# Patient Record
Sex: FEMALE | Race: WHITE | URBAN - METROPOLITAN AREA
[De-identification: names, ages, dates, MRNs, and addresses within clinical notes are randomized per-mention and may not be internally consistent; named-entity substitution may affect disease eponyms.]

---

## 2019-02-13 ENCOUNTER — OFFICE (OUTPATIENT)
Dept: URBAN - METROPOLITAN AREA CLINIC 78 | Facility: CLINIC | Age: 79
End: 2019-02-13
Payer: COMMERCIAL

## 2019-02-13 VITALS
SYSTOLIC BLOOD PRESSURE: 150 MMHG | TEMPERATURE: 98.5 F | HEART RATE: 82 BPM | HEIGHT: 62 IN | DIASTOLIC BLOOD PRESSURE: 81 MMHG | WEIGHT: 184 LBS

## 2019-02-13 DIAGNOSIS — B96.81 HELICOBACTER PYLORI [H. PYLORI] AS THE CAUSE OF DISEASES CLA: ICD-10-CM

## 2019-02-13 DIAGNOSIS — K22.4 DYSKINESIA OF ESOPHAGUS: ICD-10-CM

## 2019-02-13 DIAGNOSIS — R19.5 OTHER FECAL ABNORMALITIES: ICD-10-CM

## 2019-02-13 PROCEDURE — 99214 OFFICE O/P EST MOD 30 MIN: CPT

## 2019-02-13 RX ORDER — FAMOTIDINE 20 MG/1
TABLET, FILM COATED ORAL
Qty: 60 | Refills: 1 | Status: ACTIVE

## 2019-02-13 NOTE — SERVICEHPINOTES
79 yo white female presents for black stools. She says she was having diarrhea about 3-4 weeks ago and stools were black. She does note use of Kaopectate but she says stools were black prior to taking the Kaopectate. She was having 6-8 BMs per day and would have some incontinence due to urgency. She denies abdominal pain. Diarrhea has improved and stools are more formed now. She's had some black and brown stools. She denies NSAID uses. No bright red blood in stools. No fevers or weight loss. She denies seeing her PCP about this but notes that she did have some routine blood work about a month ago, prior to symptom onset. She denies abdominal pain or any other new GI sx. Has h/o occasional N/V which is long-standing - Has an esophageal motility disorder and was seen at [place] by Dr. Hutson in 2008. Uses nitrate pills occasionally for this. No h/o heart disease. She has a h/o iron-deficiency anemia for which she had colonoscopy, EGD, and capsule study in 2014. Her EGD at that time showed gastric erosions (with stigmata of recent oozing so these were cauterized--patient had been on Naproxen). Colonoscopy and capsule essentially negative, though capsule did not completely visualize entire small bowel. She had another EGD in 2016 showing fundic gland polyps and gastritis - H pylori was positive and was treated with Prevpac. No f/u testing done.  BR

## 2019-02-14 LAB
CBC (INCLUDES DIFF/PLT): ABSOLUTE BAND NEUTROPHILS: NORMAL CELLS/UL
CBC (INCLUDES DIFF/PLT): ABSOLUTE BASOPHILS: 42 CELLS/UL (ref 0–200)
CBC (INCLUDES DIFF/PLT): ABSOLUTE BLASTS: NORMAL CELLS/UL
CBC (INCLUDES DIFF/PLT): ABSOLUTE EOSINOPHILS: 238 CELLS/UL (ref 15–500)
CBC (INCLUDES DIFF/PLT): ABSOLUTE LYMPHOCYTES: 1974 CELLS/UL (ref 850–3900)
CBC (INCLUDES DIFF/PLT): ABSOLUTE METAMYELOCYTES: NORMAL CELLS/UL
CBC (INCLUDES DIFF/PLT): ABSOLUTE MONOCYTES: 553 CELLS/UL (ref 200–950)
CBC (INCLUDES DIFF/PLT): ABSOLUTE MYELOCYTES: NORMAL CELLS/UL
CBC (INCLUDES DIFF/PLT): ABSOLUTE NEUTROPHILS: 4193 CELLS/UL (ref 1500–7800)
CBC (INCLUDES DIFF/PLT): ABSOLUTE NUCLEATED RBC: NORMAL CELLS/UL
CBC (INCLUDES DIFF/PLT): ABSOLUTE PROMYELOCYTES: NORMAL CELLS/UL
CBC (INCLUDES DIFF/PLT): BAND NEUTROPHILS: NORMAL %
CBC (INCLUDES DIFF/PLT): BASOPHILS: 0.6 %
CBC (INCLUDES DIFF/PLT): BLASTS: NORMAL %
CBC (INCLUDES DIFF/PLT): COMMENT(S): NORMAL
CBC (INCLUDES DIFF/PLT): EOSINOPHILS: 3.4 %
CBC (INCLUDES DIFF/PLT): HEMATOCRIT: 34.9 % — LOW (ref 35–45)
CBC (INCLUDES DIFF/PLT): HEMOGLOBIN: 11 G/DL — LOW (ref 11.7–15.5)
CBC (INCLUDES DIFF/PLT): LYMPHOCYTES: 28.2 %
CBC (INCLUDES DIFF/PLT): MCH: 24.7 PG — LOW (ref 27–33)
CBC (INCLUDES DIFF/PLT): MCHC: 31.5 G/DL — LOW (ref 32–36)
CBC (INCLUDES DIFF/PLT): MCV: 78.4 FL — LOW (ref 80–100)
CBC (INCLUDES DIFF/PLT): METAMYELOCYTES: NORMAL %
CBC (INCLUDES DIFF/PLT): MONOCYTES: 7.9 %
CBC (INCLUDES DIFF/PLT): MPV: 10 FL (ref 7.5–12.5)
CBC (INCLUDES DIFF/PLT): MYELOCYTES: NORMAL %
CBC (INCLUDES DIFF/PLT): NEUTROPHILS: 59.9 %
CBC (INCLUDES DIFF/PLT): NUCLEATED RBC: NORMAL /100 WBC
CBC (INCLUDES DIFF/PLT): PLATELET COUNT: 344 THOUSAND/UL (ref 140–400)
CBC (INCLUDES DIFF/PLT): PROMYELOCYTES: NORMAL %
CBC (INCLUDES DIFF/PLT): RDW: 14.8 % (ref 11–15)
CBC (INCLUDES DIFF/PLT): REACTIVE LYMPHOCYTES: NORMAL %
CBC (INCLUDES DIFF/PLT): RED BLOOD CELL COUNT: 4.45 MILLION/UL (ref 3.8–5.1)
CBC (INCLUDES DIFF/PLT): WHITE BLOOD CELL COUNT: 7 THOUSAND/UL (ref 3.8–10.8)
COMPREHENSIVE METABOLIC PANEL: ALBUMIN/GLOBULIN RATIO: 1.5 (CALC) (ref 1–2.5)
COMPREHENSIVE METABOLIC PANEL: ALBUMIN: 4.1 G/DL (ref 3.6–5.1)
COMPREHENSIVE METABOLIC PANEL: ALKALINE PHOSPHATASE: 43 U/L (ref 33–130)
COMPREHENSIVE METABOLIC PANEL: ALT: 8 U/L (ref 6–29)
COMPREHENSIVE METABOLIC PANEL: AST: 15 U/L (ref 10–35)
COMPREHENSIVE METABOLIC PANEL: BILIRUBIN, TOTAL: 0.4 MG/DL (ref 0.2–1.2)
COMPREHENSIVE METABOLIC PANEL: BUN/CREATININE RATIO: (no result) (CALC)
COMPREHENSIVE METABOLIC PANEL: CALCIUM: 10.4 MG/DL (ref 8.6–10.4)
COMPREHENSIVE METABOLIC PANEL: CARBON DIOXIDE: 32 MMOL/L (ref 20–32)
COMPREHENSIVE METABOLIC PANEL: CHLORIDE: 102 MMOL/L (ref 98–110)
COMPREHENSIVE METABOLIC PANEL: CREATININE: 0.72 MG/DL (ref 0.6–0.93)
COMPREHENSIVE METABOLIC PANEL: EGFR AFRICAN AMERICAN: 94 ML/MIN/1.73M2 (ref 60–?)
COMPREHENSIVE METABOLIC PANEL: EGFR NON-AFR. AMERICAN: 81 ML/MIN/1.73M2 (ref 60–?)
COMPREHENSIVE METABOLIC PANEL: GLOBULIN: 2.8 G/DL (CALC) (ref 1.9–3.7)
COMPREHENSIVE METABOLIC PANEL: GLUCOSE: 85 MG/DL (ref 65–99)
COMPREHENSIVE METABOLIC PANEL: POTASSIUM: 5.3 MMOL/L (ref 3.5–5.3)
COMPREHENSIVE METABOLIC PANEL: PROTEIN, TOTAL: 6.9 G/DL (ref 6.1–8.1)
COMPREHENSIVE METABOLIC PANEL: SODIUM: 140 MMOL/L (ref 135–146)
COMPREHENSIVE METABOLIC PANEL: UREA NITROGEN (BUN): 12 MG/DL (ref 7–25)

## 2019-02-26 ENCOUNTER — ON CAMPUS - OUTPATIENT (OUTPATIENT)
Dept: URBAN - METROPOLITAN AREA HOSPITAL 63 | Facility: HOSPITAL | Age: 79
End: 2019-02-26
Payer: COMMERCIAL

## 2019-02-26 DIAGNOSIS — K92.1 MELENA: ICD-10-CM

## 2019-02-26 DIAGNOSIS — R19.7 DIARRHEA, UNSPECIFIED: ICD-10-CM

## 2019-02-26 DIAGNOSIS — K31.7 POLYP OF STOMACH AND DUODENUM: ICD-10-CM

## 2019-02-26 PROCEDURE — 43239 EGD BIOPSY SINGLE/MULTIPLE: CPT

## 2019-04-16 ENCOUNTER — OFFICE (OUTPATIENT)
Dept: URBAN - METROPOLITAN AREA CLINIC 78 | Facility: CLINIC | Age: 79
End: 2019-04-16
Payer: COMMERCIAL

## 2019-04-16 VITALS
WEIGHT: 186 LBS | DIASTOLIC BLOOD PRESSURE: 83 MMHG | SYSTOLIC BLOOD PRESSURE: 154 MMHG | HEIGHT: 62 IN | HEART RATE: 92 BPM | TEMPERATURE: 98.1 F

## 2019-04-16 DIAGNOSIS — Z86.010 PERSONAL HISTORY OF COLONIC POLYPS: ICD-10-CM

## 2019-04-16 DIAGNOSIS — R19.7 DIARRHEA, UNSPECIFIED: ICD-10-CM

## 2019-04-16 PROCEDURE — 99214 OFFICE O/P EST MOD 30 MIN: CPT

## 2019-04-16 NOTE — SERVICEHPINOTES
79 yo white female presents for f/u black stools and diarrhea. She says her stools are no longer black. She can still have random bouts of urgent diarrhea as well as formed stools. No clear pattern to symptoms. Has to wear pads due to having accidents. Her recent EGD showed benign findings - fundic gland polyps and negative duodenal biopsy. Biopsies also negative for H pylori. Her CBC in February showed a stable mild anemia with mild microcytosis. She has a h/o iron-deficiency anemia for which she had colonoscopy, EGD, and capsule study in 2014. Her EGD at that time showed gastric erosions (with stigmata of recent oozing so these were cauterized--patient had been on Naproxen). Colonoscopy and capsule essentially negative, though capsule did not completely visualize entire small bowel. BRShe had another EGD in 2016 showing fundic gland polyps and gastritis - H pylori was positive and was treated with Prevpac. She does have h/o an adenomatous colon polyp in 2007. She also has h/o diabetes but states she had been able to come off her meds. However, was started back on metformin sometime this year in hopes of helping her with weight loss. She was not aware that metformin can cause diarrhea. She is not sure if the timing of this correlates with her symptoms. Has an esophageal motility disorder and was seen at [place] by Dr. Hutson in 2008. Uses nitrate pills occasionally for this. No h/o heart disease.

## 2019-04-18 ENCOUNTER — ON CAMPUS - OUTPATIENT (OUTPATIENT)
Dept: URBAN - METROPOLITAN AREA HOSPITAL 14 | Facility: HOSPITAL | Age: 79
End: 2019-04-18
Payer: MEDICARE

## 2019-04-18 DIAGNOSIS — Z86.010 PERSONAL HISTORY OF COLONIC POLYPS: ICD-10-CM

## 2019-04-18 PROCEDURE — G0105 COLORECTAL SCRN; HI RISK IND: HCPCS
